# Patient Record
Sex: MALE | Race: WHITE | NOT HISPANIC OR LATINO
[De-identification: names, ages, dates, MRNs, and addresses within clinical notes are randomized per-mention and may not be internally consistent; named-entity substitution may affect disease eponyms.]

---

## 2021-02-19 PROBLEM — Z00.00 ENCOUNTER FOR PREVENTIVE HEALTH EXAMINATION: Status: ACTIVE | Noted: 2021-02-19

## 2021-02-24 ENCOUNTER — APPOINTMENT (OUTPATIENT)
Dept: NEUROLOGY | Facility: CLINIC | Age: 61
End: 2021-02-24
Payer: MEDICAID

## 2021-02-24 VITALS
SYSTOLIC BLOOD PRESSURE: 110 MMHG | DIASTOLIC BLOOD PRESSURE: 71 MMHG | RESPIRATION RATE: 16 BRPM | WEIGHT: 209 LBS | OXYGEN SATURATION: 95 % | HEART RATE: 81 BPM | TEMPERATURE: 98.6 F | HEIGHT: 66.5 IN | BODY MASS INDEX: 33.19 KG/M2

## 2021-02-24 DIAGNOSIS — R51.9 HEADACHE, UNSPECIFIED: ICD-10-CM

## 2021-02-24 DIAGNOSIS — Z87.891 PERSONAL HISTORY OF NICOTINE DEPENDENCE: ICD-10-CM

## 2021-02-24 DIAGNOSIS — Z80.0 FAMILY HISTORY OF MALIGNANT NEOPLASM OF DIGESTIVE ORGANS: ICD-10-CM

## 2021-02-24 DIAGNOSIS — R41.0 DISORIENTATION, UNSPECIFIED: ICD-10-CM

## 2021-02-24 DIAGNOSIS — Z81.8 FAMILY HISTORY OF OTHER MENTAL AND BEHAVIORAL DISORDERS: ICD-10-CM

## 2021-02-24 LAB
BASOPHILS # BLD AUTO: 0.04 K/UL
BASOPHILS NFR BLD AUTO: 0.6 %
EOSINOPHIL # BLD AUTO: 0.11 K/UL
EOSINOPHIL NFR BLD AUTO: 1.7 %
HCT VFR BLD CALC: 47.7 %
HGB BLD-MCNC: 15.4 G/DL
IMM GRANULOCYTES NFR BLD AUTO: 0.6 %
LYMPHOCYTES # BLD AUTO: 1.59 K/UL
LYMPHOCYTES NFR BLD AUTO: 24.3 %
MAN DIFF?: NORMAL
MCHC RBC-ENTMCNC: 28.8 PG
MCHC RBC-ENTMCNC: 32.3 GM/DL
MCV RBC AUTO: 89.2 FL
MONOCYTES # BLD AUTO: 0.68 K/UL
MONOCYTES NFR BLD AUTO: 10.4 %
NEUTROPHILS # BLD AUTO: 4.07 K/UL
NEUTROPHILS NFR BLD AUTO: 62.4 %
PLATELET # BLD AUTO: 253 K/UL
RBC # BLD: 5.35 M/UL
RBC # FLD: 12.4 %
WBC # FLD AUTO: 6.53 K/UL

## 2021-02-24 PROCEDURE — 99072 ADDL SUPL MATRL&STAF TM PHE: CPT

## 2021-02-24 PROCEDURE — 99205 OFFICE O/P NEW HI 60 MIN: CPT

## 2021-02-24 RX ORDER — CLONAZEPAM 2 MG
2 TABLET,DISINTEGRATING ORAL
Refills: 0 | Status: DISCONTINUED | COMMUNITY

## 2021-02-24 NOTE — PHYSICAL EXAM
[Person] : oriented to person [Place] : oriented to place [Time] : oriented to time [Short Term Intact] : short term memory intact [Concentration Intact] : normal concentrating ability [Visual Intact] : visual attention was ~T not ~L decreased [Naming Objects] : no difficulty naming common objects [Fluency] : fluency intact [Comprehension] : comprehension intact [Vocabulary] : adequate range of vocabulary [Cranial Nerves Optic (II)] : visual acuity intact bilaterally,  visual fields full to confrontation, pupils equal round and reactive to light [Cranial Nerves Facial (VII)] : face symmetrical [Motor Tone] : muscle tone was normal in all four extremities [Motor Strength] : muscle strength was normal in all four extremities [Involuntary Movements] : no involuntary movements were seen [Motor Handedness Right-Handed] : the patient is right hand dominant [Sensation Tactile Decrease] : light touch was intact [Abnormal Walk] : normal gait [1+] : Patella left 1+ [FreeTextEntry5] : decrease hearing right

## 2021-02-24 NOTE — ASSESSMENT
[FreeTextEntry1] : Blood markers for inflammatory disease\par CBC\par EEG given the confusional episodes\par Tramadol as needed for severe headaches\par \par I asked the patient to call me if he is symptoms continue to deteriorate\par I would also like to obtain medical records about his underlying cancer from Dr. Gleason
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
## 2021-02-24 NOTE — DISCUSSION/SUMMARY
[FreeTextEntry1] : 60-year-old man with a history of severe headaches for the past 4 months.\par The headache type appear to suggest tension headache.\par However the patient has significant risk factors for a number of possible etiologies including history of cancer which is clearly undefined at this point, head injuries in the past primarily concussions and other risk factors as on the history.\par MRI however it is within normal range

## 2021-02-24 NOTE — REVIEW OF SYSTEMS
[Sleep Disturbances] : sleep disturbances [Memory Lapses or Loss] : memory loss [Difficulty with Language] : ~M difficulty with language [Changed Thought Patterns] : changed thought patterns [Poor Coordination] : poor coordination [Difficulty Writing] : difficulty writing [Lightheadedness] : lightheadedness [Tension Headache] : tension-type headaches [Difficulty Walking] : difficulty walking

## 2021-02-25 LAB
ALBUMIN MFR SERPL ELPH: 57.7 %
ALBUMIN SERPL ELPH-MCNC: 4.4 G/DL
ALBUMIN SERPL-MCNC: 4 G/DL
ALBUMIN/GLOB SERPL: 1.4 RATIO
ALP BLD-CCNC: 114 U/L
ALPHA1 GLOB MFR SERPL ELPH: 3.5 %
ALPHA1 GLOB SERPL ELPH-MCNC: 0.2 G/DL
ALPHA2 GLOB MFR SERPL ELPH: 12.4 %
ALPHA2 GLOB SERPL ELPH-MCNC: 0.9 G/DL
ALT SERPL-CCNC: 24 U/L
ANION GAP SERPL CALC-SCNC: 12 MMOL/L
AST SERPL-CCNC: 28 U/L
B-GLOBULIN MFR SERPL ELPH: 11.2 %
B-GLOBULIN SERPL ELPH-MCNC: 0.8 G/DL
BILIRUB SERPL-MCNC: 0.2 MG/DL
BUN SERPL-MCNC: 14 MG/DL
CALCIUM SERPL-MCNC: 9 MG/DL
CHLORIDE SERPL-SCNC: 101 MMOL/L
CO2 SERPL-SCNC: 23 MMOL/L
CREAT SERPL-MCNC: 1.04 MG/DL
CRP SERPL-MCNC: 0.62 MG/DL
ERYTHROCYTE [SEDIMENTATION RATE] IN BLOOD BY WESTERGREN METHOD: 17 MM/HR
GAMMA GLOB FLD ELPH-MCNC: 1 G/DL
GAMMA GLOB MFR SERPL ELPH: 15.2 %
GLUCOSE SERPL-MCNC: 100 MG/DL
INR PPP: 1.05 RATIO
INTERPRETATION SERPL IEP-IMP: NORMAL
POTASSIUM SERPL-SCNC: 4.4 MMOL/L
PROT SERPL-MCNC: 6.9 G/DL
PT BLD: 12.5 SEC
SODIUM SERPL-SCNC: 137 MMOL/L

## 2021-03-25 ENCOUNTER — APPOINTMENT (OUTPATIENT)
Dept: NEUROLOGY | Facility: CLINIC | Age: 61
End: 2021-03-25

## 2021-04-05 ENCOUNTER — APPOINTMENT (OUTPATIENT)
Dept: NEUROLOGY | Facility: CLINIC | Age: 61
End: 2021-04-05

## 2021-04-13 ENCOUNTER — APPOINTMENT (OUTPATIENT)
Dept: NEUROLOGY | Facility: CLINIC | Age: 61
End: 2021-04-13

## 2021-04-21 ENCOUNTER — INPATIENT (INPATIENT)
Facility: HOSPITAL | Age: 61
LOS: 0 days | Discharge: HOME | DRG: 303 | End: 2021-04-22
Attending: INTERNAL MEDICINE | Admitting: INTERNAL MEDICINE
Payer: MEDICAID

## 2021-04-21 VITALS
HEART RATE: 87 BPM | TEMPERATURE: 98 F | DIASTOLIC BLOOD PRESSURE: 95 MMHG | OXYGEN SATURATION: 97 % | WEIGHT: 205.03 LBS | SYSTOLIC BLOOD PRESSURE: 146 MMHG | RESPIRATION RATE: 18 BRPM | HEIGHT: 67 IN

## 2021-04-21 DIAGNOSIS — R09.89 OTHER SPECIFIED SYMPTOMS AND SIGNS INVOLVING THE CIRCULATORY AND RESPIRATORY SYSTEMS: ICD-10-CM

## 2021-04-21 DIAGNOSIS — Z98.89 OTHER SPECIFIED POSTPROCEDURAL STATES: Chronic | ICD-10-CM

## 2021-04-21 LAB
ALBUMIN SERPL ELPH-MCNC: 4.1 G/DL — SIGNIFICANT CHANGE UP (ref 3.3–5)
ALP SERPL-CCNC: 103 U/L — SIGNIFICANT CHANGE UP (ref 40–120)
ALT FLD-CCNC: 21 U/L — SIGNIFICANT CHANGE UP (ref 10–45)
ANION GAP SERPL CALC-SCNC: 13 MMOL/L — SIGNIFICANT CHANGE UP (ref 5–17)
APTT BLD: 28.7 SEC — SIGNIFICANT CHANGE UP (ref 27.5–35.5)
AST SERPL-CCNC: 26 U/L — SIGNIFICANT CHANGE UP (ref 10–40)
BASOPHILS # BLD AUTO: 0.05 K/UL — SIGNIFICANT CHANGE UP (ref 0–0.2)
BASOPHILS NFR BLD AUTO: 0.7 % — SIGNIFICANT CHANGE UP (ref 0–2)
BILIRUB SERPL-MCNC: 0.8 MG/DL — SIGNIFICANT CHANGE UP (ref 0.2–1.2)
BUN SERPL-MCNC: 11 MG/DL — SIGNIFICANT CHANGE UP (ref 7–23)
CALCIUM SERPL-MCNC: 8.8 MG/DL — SIGNIFICANT CHANGE UP (ref 8.4–10.5)
CHLORIDE SERPL-SCNC: 99 MMOL/L — SIGNIFICANT CHANGE UP (ref 96–108)
CO2 SERPL-SCNC: 25 MMOL/L — SIGNIFICANT CHANGE UP (ref 22–31)
CREAT SERPL-MCNC: 0.88 MG/DL — SIGNIFICANT CHANGE UP (ref 0.5–1.3)
EOSINOPHIL # BLD AUTO: 0.01 K/UL — SIGNIFICANT CHANGE UP (ref 0–0.5)
EOSINOPHIL NFR BLD AUTO: 0.1 % — SIGNIFICANT CHANGE UP (ref 0–6)
GLUCOSE SERPL-MCNC: 103 MG/DL — HIGH (ref 70–99)
HCT VFR BLD CALC: 49.2 % — SIGNIFICANT CHANGE UP (ref 39–50)
HGB BLD-MCNC: 15.8 G/DL — SIGNIFICANT CHANGE UP (ref 13–17)
IMM GRANULOCYTES NFR BLD AUTO: 0.4 % — SIGNIFICANT CHANGE UP (ref 0–1.5)
INR BLD: 1 — SIGNIFICANT CHANGE UP (ref 0.88–1.16)
LYMPHOCYTES # BLD AUTO: 1.58 K/UL — SIGNIFICANT CHANGE UP (ref 1–3.3)
LYMPHOCYTES # BLD AUTO: 23.3 % — SIGNIFICANT CHANGE UP (ref 13–44)
MCHC RBC-ENTMCNC: 26.9 PG — LOW (ref 27–34)
MCHC RBC-ENTMCNC: 32.1 GM/DL — SIGNIFICANT CHANGE UP (ref 32–36)
MCV RBC AUTO: 83.8 FL — SIGNIFICANT CHANGE UP (ref 80–100)
MONOCYTES # BLD AUTO: 0.8 K/UL — SIGNIFICANT CHANGE UP (ref 0–0.9)
MONOCYTES NFR BLD AUTO: 11.8 % — SIGNIFICANT CHANGE UP (ref 2–14)
NEUTROPHILS # BLD AUTO: 4.31 K/UL — SIGNIFICANT CHANGE UP (ref 1.8–7.4)
NEUTROPHILS NFR BLD AUTO: 63.7 % — SIGNIFICANT CHANGE UP (ref 43–77)
NRBC # BLD: 0 /100 WBCS — SIGNIFICANT CHANGE UP (ref 0–0)
PLATELET # BLD AUTO: 256 K/UL — SIGNIFICANT CHANGE UP (ref 150–400)
POTASSIUM SERPL-MCNC: 4 MMOL/L — SIGNIFICANT CHANGE UP (ref 3.5–5.3)
POTASSIUM SERPL-SCNC: 4 MMOL/L — SIGNIFICANT CHANGE UP (ref 3.5–5.3)
PROT SERPL-MCNC: 7.3 G/DL — SIGNIFICANT CHANGE UP (ref 6–8.3)
PROTHROM AB SERPL-ACNC: 12 SEC — SIGNIFICANT CHANGE UP (ref 10.6–13.6)
RBC # BLD: 5.87 M/UL — HIGH (ref 4.2–5.8)
RBC # FLD: 14.1 % — SIGNIFICANT CHANGE UP (ref 10.3–14.5)
SODIUM SERPL-SCNC: 137 MMOL/L — SIGNIFICANT CHANGE UP (ref 135–145)
TROPONIN T SERPL-MCNC: 0.01 NG/ML — SIGNIFICANT CHANGE UP (ref 0–0.01)
TROPONIN T SERPL-MCNC: 0.01 NG/ML — SIGNIFICANT CHANGE UP (ref 0–0.01)
WBC # BLD: 6.78 K/UL — SIGNIFICANT CHANGE UP (ref 3.8–10.5)
WBC # FLD AUTO: 6.78 K/UL — SIGNIFICANT CHANGE UP (ref 3.8–10.5)

## 2021-04-21 PROCEDURE — 93010 ELECTROCARDIOGRAM REPORT: CPT

## 2021-04-21 PROCEDURE — 99285 EMERGENCY DEPT VISIT HI MDM: CPT | Mod: 25

## 2021-04-21 PROCEDURE — 71275 CT ANGIOGRAPHY CHEST: CPT | Mod: 26,MA

## 2021-04-21 RX ORDER — ASPIRIN/CALCIUM CARB/MAGNESIUM 324 MG
81 TABLET ORAL DAILY
Refills: 0 | Status: DISCONTINUED | OUTPATIENT
Start: 2021-04-21 | End: 2021-04-22

## 2021-04-21 RX ORDER — METOPROLOL TARTRATE 50 MG
25 TABLET ORAL ONCE
Refills: 0 | Status: COMPLETED | OUTPATIENT
Start: 2021-04-21 | End: 2021-04-21

## 2021-04-21 RX ORDER — LIDOCAINE 4 G/100G
10 CREAM TOPICAL ONCE
Refills: 0 | Status: COMPLETED | OUTPATIENT
Start: 2021-04-21 | End: 2021-04-21

## 2021-04-21 RX ORDER — ASPIRIN/CALCIUM CARB/MAGNESIUM 324 MG
162 TABLET ORAL ONCE
Refills: 0 | Status: COMPLETED | OUTPATIENT
Start: 2021-04-21 | End: 2021-04-21

## 2021-04-21 RX ORDER — NITROGLYCERIN 6.5 MG
0.4 CAPSULE, EXTENDED RELEASE ORAL
Refills: 0 | Status: DISCONTINUED | OUTPATIENT
Start: 2021-04-21 | End: 2021-04-22

## 2021-04-21 RX ORDER — ATORVASTATIN CALCIUM 80 MG/1
80 TABLET, FILM COATED ORAL AT BEDTIME
Refills: 0 | Status: DISCONTINUED | OUTPATIENT
Start: 2021-04-21 | End: 2021-04-22

## 2021-04-21 RX ORDER — ACETAMINOPHEN 500 MG
650 TABLET ORAL EVERY 6 HOURS
Refills: 0 | Status: DISCONTINUED | OUTPATIENT
Start: 2021-04-21 | End: 2021-04-22

## 2021-04-21 RX ADMIN — Medication 162 MILLIGRAM(S): at 20:39

## 2021-04-21 RX ADMIN — Medication 0.4 MILLIGRAM(S): at 22:08

## 2021-04-21 RX ADMIN — Medication 30 MILLILITER(S): at 20:39

## 2021-04-21 RX ADMIN — Medication 25 MILLIGRAM(S): at 20:39

## 2021-04-21 RX ADMIN — LIDOCAINE 10 MILLILITER(S): 4 CREAM TOPICAL at 20:39

## 2021-04-21 NOTE — H&P ADULT - PROBLEM SELECTOR PLAN 5
hx of liver cancer with 6 mm liver nodule on CT PE protocol, follows with Dr. Gleason  - consult Dr. Gleason in the AM with regards to oral chemo regimen

## 2021-04-21 NOTE — H&P ADULT - NSHPSOCIALHISTORY_GEN_ALL_CORE
lives alone, has daughter out of state. prior smoker, quit in 1979, about 5-10 pack years. denies illicit drug use, prior heavy drinker. 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

## 2021-04-21 NOTE — ED ADULT TRIAGE NOTE - CHIEF COMPLAINT QUOTE
Pt sent to ED by Dr. Gleason for evaluation of persistent non-radiating midsternal CP that began 1 week ago. Pt w/ hx of MI, liver ca, "clogged arteries". Denies fever, chills, SOB, palpitations, back pain.

## 2021-04-21 NOTE — H&P ADULT - NSICDXPASTMEDICALHX_GEN_ALL_CORE_FT
PAST MEDICAL HISTORY:  HLD (hyperlipidemia)     HTN (hypertension)     Hypercholesterolemia     Liver cancer     Myocardial infarction

## 2021-04-21 NOTE — H&P ADULT - NSHPPHYSICALEXAM_GEN_ALL_CORE
VITAL SIGNS:  T(C): 36.4 (04-21-21 @ 22:23), Max: 37 (04-21-21 @ 17:48)  T(F): 97.5 (04-21-21 @ 22:23), Max: 98.6 (04-21-21 @ 17:48)  HR: 68 (04-21-21 @ 22:23) (67 - 87)  BP: 133/82 (04-21-21 @ 22:23) (133/82 - 165/84)  BP(mean): --  RR: 16 (04-21-21 @ 22:23) (16 - 18)  SpO2: 95% (04-21-21 @ 22:23) (95% - 97%)  Wt(kg): --    PHYSICAL EXAM:    Constitutional: WDWN, lying comfortably in bed, NAD  Head: Nc/At  Eyes: PERRL, EOMI, clear conjunctiva  ENT: no nasal discharge; uvula midline, no oropharyngeal erythema or exudates; MMM  Neck: supple; no JVD or thyromegaly  Respiratory: CTA b/l, no wheezes, rales, or rhonchi  Cardiac: +S1/S2, +RRR, no murmurs, rubs, or gallops  Gastrointestinal: soft, non-tender, non-distended, no rebound/guarding, no palpable masses, normoactive bowel sounds x4  Back: spine midline, no bony tenderness or step-offs; no CVAT B/L  Extremities: WWP, no clubbing or cyanosis, no peripheral edema  Musculoskeletal: NROM x4; no joint swelling, tenderness or erythema  Vascular: 2+ radial and DP pulses b/l  Dermatologic: skin warm, dry and intact, no rashes, wounds, or scars  Lymphatic: no submandibular or cervical LAD  Neurologic: AAOx3, CNII-XII grossly intact, no focal deficits  Psychiatric: affect and characteristics of appearance, verbalizations, behaviors are appropriate VITAL SIGNS:  T(C): 36.4 (04-21-21 @ 22:23), Max: 37 (04-21-21 @ 17:48)  T(F): 97.5 (04-21-21 @ 22:23), Max: 98.6 (04-21-21 @ 17:48)  HR: 68 (04-21-21 @ 22:23) (67 - 87)  BP: 133/82 (04-21-21 @ 22:23) (133/82 - 165/84)  BP(mean): --  RR: 16 (04-21-21 @ 22:23) (16 - 18)  SpO2: 95% (04-21-21 @ 22:23) (95% - 97%)  Wt(kg): --    PHYSICAL EXAM:    Constitutional: WDWN, lying comfortably in bed, NAD, face appears reddish  Head: Nc/At  Eyes: PERRL, EOMI, clear conjunctiva  ENT: no nasal discharge; MMM  Neck: supple; no JVD  Respiratory: CTA b/l, no wheezes, rales, or rhonchi  Cardiac: +S1/S2, +RRR, no murmurs, rubs, or gallops  Gastrointestinal: soft, non-tender, non-distended, normoactive bowel sounds x4; abdominal surgical scar RUQ and infra umbilical noted  Extremities: WWP, no clubbing or cyanosis, no peripheral edema  Vascular: 2+ radial and DP pulses b/l  Dermatologic: skin warm, dry and intact, no rashes, wounds  Neurologic: AAOx3, CNII-XII grossly intact, no focal deficits, 5/5 strength b/l UE and LE

## 2021-04-21 NOTE — H&P ADULT - PROBLEM SELECTOR PLAN 6
F: no IVF  E: K>4 Mg>2, monitor and replete as needed  N: DASH diet  Ppx: lovenox  D: cardiac telemetry  FULL CODE

## 2021-04-21 NOTE — ED PROVIDER NOTE - IV ALTEPLASE ADMIN OUTSIDE HIDDEN
Review of Systems/Medical History  Patient summary reviewed  Chart reviewed  No history of anesthetic complications     Cardiovascular  Negative cardio ROS Exercise tolerance (METS): >4 ,     Pulmonary  Smoker cigarette smoker  ,   Comment: Lung ca     GI/Hepatic    GI bleeding (requiring blood transfusion) , history of,        Kidney stones, Genitourinary malignancy renal cancer,   Comment: Hx kidney carcinoma  Endo/Other  Negative endo/other ROS      GYN       Hematology  Negative hematology ROS      Musculoskeletal  Back pain (3 crushed vertebrae due to injury as a kid) , lumbar pain,   Arthritis     Neurology      Comment: Brain tumor s/p radiation, left sided weakness (mainly coordination) Psychology   Negative psychology ROS              Physical Exam    Airway    Mallampati score: I  TM Distance: >3 FB  Neck ROM: full     Dental   lower dentures and upper dentures,     Cardiovascular  Comment: Negative ROS, Rhythm: regular, Rate: normal,     Pulmonary  Breath sounds clear to auscultation, Decreased breath sounds,     Other Findings  edentulous      Anesthesia Plan  ASA Score- 3     Anesthesia Type- general with ASA Monitors  Additional Monitors:   Airway Plan:           Plan Factors-Exercise tolerance (METS): >4     Chart reviewed  Patient summary reviewed  Patient is a current smoker  Patient not instructed to abstain from smoking on day of procedure  Patient smoked on day of surgery  Induction- intravenous  Postoperative Plan- Plan for postoperative opioid use  Informed Consent- Anesthetic plan and risks discussed with patient and son  show

## 2021-04-21 NOTE — H&P ADULT - ASSESSMENT
55M with CVA (2-3 years ago, residual left arm numbness/weakness though improving almost baseline), nonobstructive CAD (prior MI), HLD, HTN, Hep C, liver cancer (sees Dr. Gleason, oncologist, s/p liver resection October 2020 at [facility]), presents with 7 day hx of chest pain. Trop neg x2, EKG w/ new LAFB (qR I, aVL, rS inferior leads), LAD compared to EKG at [facility] 10/2016. Admit for unstable angina management

## 2021-04-21 NOTE — H&P ADULT - NSTELEHEALTH_GEN_ALL_CORE
No
60M, from home, self ambulatory,  recently [place]'ed from [facility],  with a PMhx of recently diagnosed HLD , R  MCA stroke with residual L arm weakness( feb 2021)  reports to the ED c.o shortness of breath for x2 days. Pt admitted for SOB work up

## 2021-04-21 NOTE — ED PROVIDER NOTE - OBJECTIVE STATEMENT
60M with a pmhx of CAD, MI, HTN, HLD, Hep C, liver CA, sent in by Dr Gleason presents with nonexertional sternal chest pain. Patient reports pain worsens at night. Patient denies any fever, chills or any other acute medical complaints at this time. patient denies any illicit drug use. 60M with a pmhx of CAD, MI, HTN, HLD, Hep C, liver CA, sent in by Dr Gleason presents with nonexertional sternal chest pain. Patient reports pain worsens at night. Patient denies any fever, chills or any other acute medical complaints at this time. patient denies any illicit drug use.  pt as per Dr Gleason with chronic RUQ biloma in location of removed hepatoma w chronic pain at this location but no intervention advised. pt does endorse unchanged mild RUQ pain .

## 2021-04-21 NOTE — H&P ADULT - PROBLEM SELECTOR PLAN 2
hx of nonbstructive CAD s/p diagnostic [facility] 10/2016 with Dr. Alvarez at [facility]  - continue atorvastatin 80 mg QHS  - continue ASA 81 mg

## 2021-04-21 NOTE — ED PROVIDER NOTE - PMH
HLD (hyperlipidemia)    HTN (hypertension)    Hypercholesterolemia    Liver cancer    Myocardial infarction

## 2021-04-21 NOTE — H&P ADULT - PROBLEM SELECTOR PLAN 4
hx of HTN, not on medications  - start metoprolol 25 mg QHS  - consider ACEi/ARB if HTN uncontrolled

## 2021-04-21 NOTE — H&P ADULT - PROBLEM SELECTOR PLAN 1
relieved with nitro, patient has had chest pain for 7 days, nonreproducible. patient with known nonobstructive CAD s/p LHC 10/2016 at [facility]. Patient noncomplaint with asa and atorvastatin, poor follow-up. EKG shows new LAFB (prior in 10/2016 was NSR with LAD). trop neg x2  - c/w PRN nitroglycerin  - pain control w/ tylenol for now, consider morphine if worsening  - serial EKG and trend cardiac enzymes if chest pain worsens  - CCTA in the morning  - start metoprolol 25 mg q12h

## 2021-04-21 NOTE — ED PROVIDER NOTE - CLINICAL SUMMARY MEDICAL DECISION MAKING FREE TEXT BOX
60M with known liver CA with x1 week of new chest pain.    DDx: Consider ACS. Consider PE, although less likely due to patient being nonpleuritic, and with no hypoxia. Consider metastatic disease, possible pain radiating from tumor.     Plan: Will order Labs EKG and CXR. Will discuss patient care with Dr. Gleason. Anticipate admissions. 60M with known liver CA with x1 week of new chest pain.    DDx: Consider ACS. Consider PE, although less likely due to patient being nonpleuritic, and with no hypoxia. Consider metastatic disease, possible pain radiating from tumor.     Plan: Will order Labs EKG and CXR. Will discuss patient care with Dr. Gleason.

## 2021-04-21 NOTE — H&P ADULT - HISTORY OF PRESENT ILLNESS
55M with CVA (2-3 years ago, residual left arm numbness/weakness though improving almost baseline), nonobstructive CAD (prior MI), HLD, HTN, Hep C, liver cancer (sees Dr. Gleason, oncologist, s/p liver resection October 2020 at [facility]), presents with 7 day hx of chest pain. Patient is a poor historian and noncompliant with home med of aspirin and atorvastatin. Chest pain for past 7 days, substernal, sharp, radiates intermittently to lower jaw and left arm, associated with palpitations, pain is 10/10 at night and 6/10 throughout the day. Patient thought the pain would simply "go away" and finally came in today as it became unbearable during his f/u with Dr. Gleason outpatient. Patient has had poor f/u and never seen cardiologist, though follows intermittently with neurologist. He states ever since his cancer diagnosis and social stressor of securing his daughter a property/home, he has not had time for care of himself. He is noncompliant on oral chemo for his liver cancer. Of note, at [facility] on 10/7/16 s/p [facility] w/ no intervention cath revealed mild non obstructive disease. ROS: patient +palpitations, chest pain, decreased physical tolerance, denies fever, chills, n/v, abd pain, shortness of breath    ED vitals: T 98.5, HR 87, /95, RR 18, O2 sat 97% on RA  ED labs: Hb 15.8, Plt 256, BUN 11, Cr 0.88, Gluc 103, Trop 0.01 x2  ED studies: EKG NSR with LAFB, TWI V1, no ischemic ST changes. CTA PE protocol no PE, 6 cm lesion in R lobe of liver  ED course:  mg, loperssor 25 mg x1, nitro 0.4 mg x1, maalox 30 mg x1, lidocaine 2% 10 mL x1

## 2021-04-21 NOTE — H&P ADULT - NSHPLABSRESULTS_GEN_ALL_CORE
LABS:                         15.8   6.78  )-----------( 256      ( 21 Apr 2021 16:26 )             49.2     04-21    137  |  99  |  11  ----------------------------<  103<H>  4.0   |  25  |  0.88    Ca    8.8      21 Apr 2021 16:26    TPro  7.3  /  Alb  4.1  /  TBili  0.8  /  DBili  x   /  AST  26  /  ALT  21  /  AlkPhos  103  04-21    PT/INR - ( 21 Apr 2021 16:26 )   PT: 12.0 sec;   INR: 1.00          PTT - ( 21 Apr 2021 16:26 )  PTT:28.7 sec    CARDIAC MARKERS ( 21 Apr 2021 20:33 )  x     / 0.01 ng/mL / x     / x     / x      CARDIAC MARKERS ( 21 Apr 2021 16:26 )  x     / 0.01 ng/mL / x     / x     / x        RADIOLOGY, EKG & ADDITIONAL TESTS:  < from: CT Angio Chest PE Protocol w/ IV Cont (04.21.21 @ 21:23) >    FINDINGS:    No pulmonary embolism is seen. The pulmonary trunk is normal caliber. There is no airspace consolidation. The central airways are patent. There is no pleural effusion.    Small calcified and noncalcified plaque in the thoracic aorta. No thoracic aortic dissection or aneurysm. The heart is normal in size. No pericardial effusion is seen. Trace calcification of the aortic valve.    No mediastinal, hilar or axillary lymphadenopathy is seen.    Limited evaluation of the upper abdomen demonstrates a small hiatal hernia. There is a 6 x 3 x 4 cm subcapsular hypoattenuating area in the right lobe of the liver, with overlying capsular retraction and internal curvilinear hyperdensities possibly from prior intervention. Mild colonic diverticulosis.    Evaluation of the osseous structures demonstrates degenerative changes of the spine and DISH. Partially imaged cervical spinal fusion hardware.  Multiple healed right rib fracture deformities.    IMPRESSION:  1.No pulmonary embolism or other acute pathology seen.  2.  6 cm lesion in the right lobe of the liver, with features suggestive of prior intervention. Correlate with patient history and/or prior imaging if available. Otherwise, follow-up MRI is recommended.

## 2021-04-21 NOTE — ED ADULT NURSE NOTE - OBJECTIVE STATEMENT
Pt AOX4. Pt /co midsternal chest pain that started a week ago. Pt states "for the past week I have felt a little SOB when the chest pain started and I also feel constantly nauseous". Pt denies fevers, chills, numbness, weakness, tingling, changes in vision, radiating pain. Pt speaking in full complete sentences. Respirations even and unlabored.

## 2021-04-21 NOTE — ED ADULT NURSE REASSESSMENT NOTE - NS ED NURSE REASSESS COMMENT FT1
Patient verbalized improvement in chest pain SP Nitroglycerin sublingual given as ordered. Assessment ongoing.
Received patient in stretcher. AOX4. Pt with elevated BP and complaints of chest pain 6/10. Patient oriented to ED area. Plan of care discussed and verbalized understanding. All needs attended. Pt on cardiac monitor. Hourly rounding in progress.

## 2021-04-21 NOTE — H&P ADULT - NSICDXFAMILYHX_GEN_ALL_CORE_FT
FAMILY HISTORY:  Mother  Still living? Unknown  Family history of aortic aneurysm, Age at diagnosis: Age Unknown  Family history of cancer, Age at diagnosis: Age Unknown

## 2021-04-22 ENCOUNTER — TRANSCRIPTION ENCOUNTER (OUTPATIENT)
Age: 61
End: 2021-04-22

## 2021-04-22 VITALS
OXYGEN SATURATION: 94 % | SYSTOLIC BLOOD PRESSURE: 143 MMHG | DIASTOLIC BLOOD PRESSURE: 83 MMHG | RESPIRATION RATE: 20 BRPM | HEART RATE: 72 BPM

## 2021-04-22 DIAGNOSIS — C22.9 MALIGNANT NEOPLASM OF LIVER, NOT SPECIFIED AS PRIMARY OR SECONDARY: ICD-10-CM

## 2021-04-22 DIAGNOSIS — E78.5 HYPERLIPIDEMIA, UNSPECIFIED: ICD-10-CM

## 2021-04-22 DIAGNOSIS — I10 ESSENTIAL (PRIMARY) HYPERTENSION: ICD-10-CM

## 2021-04-22 DIAGNOSIS — R63.8 OTHER SYMPTOMS AND SIGNS CONCERNING FOOD AND FLUID INTAKE: ICD-10-CM

## 2021-04-22 DIAGNOSIS — I20.0 UNSTABLE ANGINA: ICD-10-CM

## 2021-04-22 DIAGNOSIS — I25.10 ATHEROSCLEROTIC HEART DISEASE OF NATIVE CORONARY ARTERY WITHOUT ANGINA PECTORIS: ICD-10-CM

## 2021-04-22 LAB
ANION GAP SERPL CALC-SCNC: 11 MMOL/L — SIGNIFICANT CHANGE UP (ref 5–17)
BUN SERPL-MCNC: 13 MG/DL — SIGNIFICANT CHANGE UP (ref 7–23)
CALCIUM SERPL-MCNC: 9 MG/DL — SIGNIFICANT CHANGE UP (ref 8.4–10.5)
CHLORIDE SERPL-SCNC: 99 MMOL/L — SIGNIFICANT CHANGE UP (ref 96–108)
CO2 SERPL-SCNC: 27 MMOL/L — SIGNIFICANT CHANGE UP (ref 22–31)
COVID-19 SPIKE DOMAIN AB INTERP: NEGATIVE — SIGNIFICANT CHANGE UP
COVID-19 SPIKE DOMAIN ANTIBODY RESULT: 0.4 U/ML — SIGNIFICANT CHANGE UP
CREAT SERPL-MCNC: 0.89 MG/DL — SIGNIFICANT CHANGE UP (ref 0.5–1.3)
GLUCOSE SERPL-MCNC: 103 MG/DL — HIGH (ref 70–99)
HCT VFR BLD CALC: 49.3 % — SIGNIFICANT CHANGE UP (ref 39–50)
HCV AB S/CO SERPL IA: 13.83 S/CO — SIGNIFICANT CHANGE UP
HCV AB SERPL-IMP: REACTIVE
HGB BLD-MCNC: 15.7 G/DL — SIGNIFICANT CHANGE UP (ref 13–17)
MAGNESIUM SERPL-MCNC: 2.1 MG/DL — SIGNIFICANT CHANGE UP (ref 1.6–2.6)
MCHC RBC-ENTMCNC: 26.7 PG — LOW (ref 27–34)
MCHC RBC-ENTMCNC: 31.8 GM/DL — LOW (ref 32–36)
MCV RBC AUTO: 83.8 FL — SIGNIFICANT CHANGE UP (ref 80–100)
NRBC # BLD: 0 /100 WBCS — SIGNIFICANT CHANGE UP (ref 0–0)
PLATELET # BLD AUTO: 247 K/UL — SIGNIFICANT CHANGE UP (ref 150–400)
POTASSIUM SERPL-MCNC: 3.8 MMOL/L — SIGNIFICANT CHANGE UP (ref 3.5–5.3)
POTASSIUM SERPL-SCNC: 3.8 MMOL/L — SIGNIFICANT CHANGE UP (ref 3.5–5.3)
RBC # BLD: 5.88 M/UL — HIGH (ref 4.2–5.8)
RBC # FLD: 14.3 % — SIGNIFICANT CHANGE UP (ref 10.3–14.5)
SARS-COV-2 IGG+IGM SERPL QL IA: 0.4 U/ML — SIGNIFICANT CHANGE UP
SARS-COV-2 IGG+IGM SERPL QL IA: NEGATIVE — SIGNIFICANT CHANGE UP
SARS-COV-2 RNA SPEC QL NAA+PROBE: SIGNIFICANT CHANGE UP
SODIUM SERPL-SCNC: 137 MMOL/L — SIGNIFICANT CHANGE UP (ref 135–145)
WBC # BLD: 4.81 K/UL — SIGNIFICANT CHANGE UP (ref 3.8–10.5)
WBC # FLD AUTO: 4.81 K/UL — SIGNIFICANT CHANGE UP (ref 3.8–10.5)

## 2021-04-22 PROCEDURE — 85025 COMPLETE CBC W/AUTO DIFF WBC: CPT

## 2021-04-22 PROCEDURE — 86769 SARS-COV-2 COVID-19 ANTIBODY: CPT

## 2021-04-22 PROCEDURE — 71275 CT ANGIOGRAPHY CHEST: CPT

## 2021-04-22 PROCEDURE — 83735 ASSAY OF MAGNESIUM: CPT

## 2021-04-22 PROCEDURE — 99285 EMERGENCY DEPT VISIT HI MDM: CPT | Mod: 25

## 2021-04-22 PROCEDURE — 85610 PROTHROMBIN TIME: CPT

## 2021-04-22 PROCEDURE — 93005 ELECTROCARDIOGRAM TRACING: CPT

## 2021-04-22 PROCEDURE — 80053 COMPREHEN METABOLIC PANEL: CPT

## 2021-04-22 PROCEDURE — 99239 HOSP IP/OBS DSCHRG MGMT >30: CPT

## 2021-04-22 PROCEDURE — 87521 HEPATITIS C PROBE&RVRS TRNSC: CPT

## 2021-04-22 PROCEDURE — U0005: CPT

## 2021-04-22 PROCEDURE — 75574 CT ANGIO HRT W/3D IMAGE: CPT | Mod: 26

## 2021-04-22 PROCEDURE — 86803 HEPATITIS C AB TEST: CPT

## 2021-04-22 PROCEDURE — 80048 BASIC METABOLIC PNL TOTAL CA: CPT

## 2021-04-22 PROCEDURE — 93306 TTE W/DOPPLER COMPLETE: CPT

## 2021-04-22 PROCEDURE — 36415 COLL VENOUS BLD VENIPUNCTURE: CPT

## 2021-04-22 PROCEDURE — 85027 COMPLETE CBC AUTOMATED: CPT

## 2021-04-22 PROCEDURE — 85730 THROMBOPLASTIN TIME PARTIAL: CPT

## 2021-04-22 PROCEDURE — 84484 ASSAY OF TROPONIN QUANT: CPT

## 2021-04-22 PROCEDURE — U0003: CPT

## 2021-04-22 PROCEDURE — 93306 TTE W/DOPPLER COMPLETE: CPT | Mod: 26

## 2021-04-22 PROCEDURE — 75574 CT ANGIO HRT W/3D IMAGE: CPT

## 2021-04-22 RX ORDER — ENOXAPARIN SODIUM 100 MG/ML
40 INJECTION SUBCUTANEOUS EVERY 24 HOURS
Refills: 0 | Status: DISCONTINUED | OUTPATIENT
Start: 2021-04-22 | End: 2021-04-22

## 2021-04-22 RX ORDER — METOPROLOL TARTRATE 50 MG
25 TABLET ORAL
Refills: 0 | Status: DISCONTINUED | OUTPATIENT
Start: 2021-04-22 | End: 2021-04-22

## 2021-04-22 RX ADMIN — Medication 25 MILLIGRAM(S): at 06:27

## 2021-04-22 RX ADMIN — Medication 25 MILLIGRAM(S): at 17:36

## 2021-04-22 RX ADMIN — ATORVASTATIN CALCIUM 80 MILLIGRAM(S): 80 TABLET, FILM COATED ORAL at 01:02

## 2021-04-22 RX ADMIN — ENOXAPARIN SODIUM 40 MILLIGRAM(S): 100 INJECTION SUBCUTANEOUS at 07:00

## 2021-04-22 RX ADMIN — Medication 81 MILLIGRAM(S): at 15:25

## 2021-04-22 NOTE — DISCHARGE NOTE NURSING/CASE MANAGEMENT/SOCIAL WORK - PATIENT PORTAL LINK FT
You can access the FollowMyHealth Patient Portal offered by [facility] by registering at the following website: [email or web address]. By joining [facility]’s FollowMyHealth portal, you will also be able to view your health information using other applications (apps) compatible with our system.

## 2021-04-22 NOTE — CONSULT NOTE ADULT - ASSESSMENT
the patient has a history of a hepatocellular carcinoma and erythrocytosis.  He has a history of exhaustion is to testosterone administration.  His erythrocytosis could be either secondary to an erythropoietin secreting hepatocellular carcinoma or from exogenous testosterone or early polycythemia vera.  His  workup has not been completed.      The patient needed troponin levels and BNP levels to assist in the possible diagnosis of coronary artery disease requiring intervention.  Those levels were not seen in the emergency room blood work.

## 2021-04-22 NOTE — DISCHARGE NOTE PROVIDER - CARE PROVIDERS DIRECT ADDRESSES
,DirectAddress_Unknown ,DirectAddress_Unknown,[email or web address]

## 2021-04-22 NOTE — DISCHARGE NOTE PROVIDER - HOSPITAL COURSE
55M with CVA (2-3 years ago, residual left arm numbness/weakness though improving almost baseline), nonobstructive CAD (prior MI), HLD, HTN, Hep C, liver cancer (sees Dr. Gleason, oncologist, s/p liver resection October 2020 at [facility]), presents with 7 day hx of chest pain. Patient is a poor historian and noncompliant with home med of aspirin and atorvastatin. Chest pain for past 7 days, substernal, sharp, radiates intermittently to lower jaw and left arm, associated with palpitations, pain is 10/10 at night and 6/10 throughout the day. Patient thought the pain would simply "go away" and finally came in today as it became unbearable during his f/u with Dr. Gleason outpatient. Patient has had poor f/u and never seen cardiologist, though follows intermittently with neurologist. He states ever since his cancer diagnosis and social stressor of securing his daughter a property/home, he has not had time for care of himself. He is noncompliant on oral chemo for his liver cancer. Of note, at [facility] on 10/7/16 s/p [facility] w/ no intervention cath revealed mild non obstructive disease. ROS: patient +palpitations, chest pain, decreased physical tolerance, denies fever, chills, n/v, abd pain, shortness of breath.    ED vitals: T 98.5, HR 87, /95, RR 18, O2 sat 97% on RA  ED labs: Hb 15.8, Plt 256, BUN 11, Cr 0.88, Gluc 103, Trop 0.01 x2  ED studies: EKG NSR with LAFB, TWI V1, no ischemic ST changes. CTA PE protocol no PE, 6 cm lesion in R lobe of liver  ED course:  mg, loperssor 25 mg x1, nitro 0.4 mg x1, maalox 30 mg x1, lidocaine 2% 10 mL x1 55M, poor historian, non-complaint w/ meds, with CVA (2-3 years ago, residual left arm numbness/weakness though improving almost baseline), non-obstructive CAD (cath at [facility] 2016), HLD, HTN, Hep C, liver cancer (sees Dr. Gleason, oncologist, s/p liver resection October 2020 at [facility]), presents with 7 day hx of substernal sharp chest pain associated with lightheadedness, n/v, palpitations.  He is noncompliant on oral chemo for his liver cancer.  VSS. CTA PE protocol no PE, 6 cm lesion in R lobe of liver. Trop neg x2. EKG NSR 70s, no ischemic ST changes. Admit to cardiac tele and ruled out for ACS. ECHO revealed EF 60-65%, no significant valvular disease. CCTA revealed moderate calcium score 131, non-obstructive CAD.     On the day of discharge, the patient was seen and examined. Symptoms improved. Vital signs are stable. Labs and imaging reviewed. Patient is medically optimized and hemodynamically stable. Return precautions discussed, medication teach back done, and importance of physician followup emphasized. The patient verbalized understanding.

## 2021-04-22 NOTE — CONSULT NOTE ADULT - TIME BILLING
the patient needs BNP and troponin levels and needs to be evaluated for inadequate arterial supply for the cardiac work he is performing with probably unstable angina and labile hypertension.  Erythrocytosis does not appear to play a role in this regard.  Please note the patient was seen and treated yesterday and seen and treated today.

## 2021-04-22 NOTE — DISCHARGE NOTE PROVIDER - NSDCCPTREATMENT_GEN_ALL_CORE_FT
PRINCIPAL PROCEDURE  Procedure: 2D echocardiography  Findings and Treatment: FINDINGS:  Left Ventricle:  The left ventricle is normal in size, wall thickness, and systolic function with a calculated ejection fraction of 60-65%. There is normal left ventricular diastolic function and filling pressure.  Right Ventricle:  The right ventricle is normal in size. Right ventricular systolic function is normal. The tricuspid annular plane systolic excursion (TAPSE) is 19.00 mm (normal >=17 mm).  Left Atrium:  The left atrium is normal in size. Left atrial volume index (GILBERT) is 18.1 ml/m².  Right Atrium:  The right atrium is normal in size.  Aortic Valve:  The aortic valve is tricuspid with normal structure and function without stenosis. There is no evidence of aortic regurgitation.  Mitral Valve:  Structurally normal mitral valve with normal leaflet excursion. There is trace mitral regurgitation.  Tricuspid Valve:  Structurally normal tricuspid valve with normal leaflet excursion. There is trace tricuspid regurgitation. There was insufficient tricuspid regurgitation detected from which to calculate pulmonary artery systolic pressure.  Inferior Vena Cava:  The inferior vena cava is normal in size (<2.1cm) with normal inspiratory collapse (>50%) consistent with normal right atrial pressure (  3, range 0-5mmHg).  Pulmonic Valve:  Structurally normal pulmonic valve with normal leaflet excursion. There is trace pulmonic regurgitation.  Aorta:  The aortic root is normal in size.  Pericardium:  No pericardial effusion is seen.      SECONDARY PROCEDURE  Procedure: Coronary CT angiography (CTA) with calcium scoring  Findings and Treatment: IMPRESSION:  1.  The calcium score is moderate at 131 Agatston units, which is at the 74th percentile, adjusted for age, gender and race.  2.  Non-obstructive coronary artery disease.

## 2021-04-22 NOTE — DISCHARGE NOTE PROVIDER - NSDCCPCAREPLAN_GEN_ALL_CORE_FT
PRINCIPAL DISCHARGE DIAGNOSIS  Diagnosis: Chest pain  Assessment and Plan of Treatment: You came into the hospital with chest pain.       PRINCIPAL DISCHARGE DIAGNOSIS  Diagnosis: Chest pain  Assessment and Plan of Treatment: You came into the hospital with chest pain and underwent a cardiac work up. You had a normal echocardiogram with normal Ejection Fraction 60-65%. CTA Scan of the Heart showed non-obstructive coronary artery disease, which means you have mild plaque buildup in the arteries of the heart. This would be treated medically by continuing baby Aspirin and statin cholesterol medication every day. Follow up with your primary care provider and oncologist for further evaluation of the chest pain.      SECONDARY DISCHARGE DIAGNOSES  Diagnosis: Liver cancer  Assessment and Plan of Treatment: Follow up with oncologist Dr Gleason for further work up and treatment.
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
## 2021-04-22 NOTE — DISCHARGE NOTE PROVIDER - PROVIDER TOKENS
PROVIDER:[TOKEN:[[id number]],FOLLOWUP:[2 weeks],ESTABLISHEDPATIENT:[T]] PROVIDER:[TOKEN:[[id number]],FOLLOWUP:[2 weeks],ESTABLISHEDPATIENT:[T]],PROVIDER:[TOKEN:[[id number]],FOLLOWUP:[Routine]]

## 2021-04-22 NOTE — DISCHARGE NOTE PROVIDER - NSDCMRMEDTOKEN_GEN_ALL_CORE_FT
acetaminophen 325 mg oral tablet: 2 tab(s) orally every 6 hours, As needed, Mild Pain (1 - 3)  aspirin 81 mg oral delayed release tablet: 1 tab(s) orally once a day  atorvastatin 80 mg oral tablet: 1 tab(s) orally once a day (at bedtime)  CITALOPRAM   TAB 40MG:   CLONAZEPAM   TAB 2MG:  orally once a day, As Needed  OXYCODONE    TAB 5MG:

## 2021-04-22 NOTE — DISCHARGE NOTE PROVIDER - CARE PROVIDER_API CALL
Mychal Gleason)  Internal Medicine; Medical Oncology  [address] [place] [address]  Phone: [phone]  Fax: [phone]  Established Patient  Follow Up Time: 2 weeks   Mychal Gleason)  Internal Medicine; Medical Oncology  [address] [place] [address]  Phone: [phone]  Fax: [phone]  Established Patient  Follow Up Time: 2 weeks    Lauro Garcia)  Cardiovascular Disease; Internal Medicine  Cardiology [facility], [address] [place] [address]  Phone: [phone]  Fax: [phone]  Follow Up Time: Routine

## 2021-04-22 NOTE — DISCHARGE NOTE PROVIDER - NSDCFUADDINST_GEN_ALL_CORE_FT
You are set up for [employer] service at 6:30pm. You may call [email or web address] at [phone] if you have questions or concerns regarding the taxi service.

## 2021-04-27 LAB — HCV RNA FLD QL NAA+PROBE: SIGNIFICANT CHANGE UP

## 2021-04-28 DIAGNOSIS — R07.9 CHEST PAIN, UNSPECIFIED: ICD-10-CM

## 2021-04-28 DIAGNOSIS — I69.334 MONOPLEGIA OF UPPER LIMB FOLLOWING CEREBRAL INFARCTION AFFECTING LEFT NON-DOMINANT SIDE: ICD-10-CM

## 2021-04-28 DIAGNOSIS — C22.9 MALIGNANT NEOPLASM OF LIVER, NOT SPECIFIED AS PRIMARY OR SECONDARY: ICD-10-CM

## 2021-04-28 DIAGNOSIS — R35.1 NOCTURIA: ICD-10-CM

## 2021-04-28 DIAGNOSIS — N40.1 BENIGN PROSTATIC HYPERPLASIA WITH LOWER URINARY TRACT SYMPTOMS: ICD-10-CM

## 2021-04-28 DIAGNOSIS — Z91.14 PATIENT'S OTHER NONCOMPLIANCE WITH MEDICATION REGIMEN: ICD-10-CM

## 2021-04-28 DIAGNOSIS — R68.82 DECREASED LIBIDO: ICD-10-CM

## 2021-04-28 DIAGNOSIS — Z79.82 LONG TERM (CURRENT) USE OF ASPIRIN: ICD-10-CM

## 2021-04-28 DIAGNOSIS — I25.2 OLD MYOCARDIAL INFARCTION: ICD-10-CM

## 2021-04-28 DIAGNOSIS — Z87.891 PERSONAL HISTORY OF NICOTINE DEPENDENCE: ICD-10-CM

## 2021-04-28 DIAGNOSIS — I10 ESSENTIAL (PRIMARY) HYPERTENSION: ICD-10-CM

## 2021-04-28 DIAGNOSIS — N52.9 MALE ERECTILE DYSFUNCTION, UNSPECIFIED: ICD-10-CM

## 2021-04-28 DIAGNOSIS — E78.5 HYPERLIPIDEMIA, UNSPECIFIED: ICD-10-CM

## 2021-04-28 DIAGNOSIS — Z86.19 PERSONAL HISTORY OF OTHER INFECTIOUS AND PARASITIC DISEASES: ICD-10-CM

## 2021-04-28 DIAGNOSIS — I25.110 ATHEROSCLEROTIC HEART DISEASE OF NATIVE CORONARY ARTERY WITH UNSTABLE ANGINA PECTORIS: ICD-10-CM

## 2021-04-28 DIAGNOSIS — B19.20 UNSPECIFIED VIRAL HEPATITIS C WITHOUT HEPATIC COMA: ICD-10-CM

## 2021-11-16 PROBLEM — C22.9 MALIGNANT NEOPLASM OF LIVER, NOT SPECIFIED AS PRIMARY OR SECONDARY: Chronic | Status: ACTIVE | Noted: 2021-04-21

## 2021-11-16 PROBLEM — I10 ESSENTIAL (PRIMARY) HYPERTENSION: Chronic | Status: ACTIVE | Noted: 2021-04-21

## 2021-11-16 PROBLEM — E78.5 HYPERLIPIDEMIA, UNSPECIFIED: Chronic | Status: ACTIVE | Noted: 2021-04-21

## 2021-11-17 ENCOUNTER — APPOINTMENT (OUTPATIENT)
Dept: HEART AND VASCULAR | Facility: CLINIC | Age: 61
End: 2021-11-17
Payer: MEDICAID

## 2021-11-17 ENCOUNTER — NON-APPOINTMENT (OUTPATIENT)
Age: 61
End: 2021-11-17

## 2021-11-17 VITALS
HEIGHT: 66 IN | HEART RATE: 92 BPM | SYSTOLIC BLOOD PRESSURE: 144 MMHG | BODY MASS INDEX: 35.36 KG/M2 | DIASTOLIC BLOOD PRESSURE: 94 MMHG | WEIGHT: 220 LBS | OXYGEN SATURATION: 96 % | TEMPERATURE: 96.1 F

## 2021-11-17 DIAGNOSIS — C22.9 MALIGNANT NEOPLASM OF LIVER, NOT SPECIFIED AS PRIMARY OR SECONDARY: ICD-10-CM

## 2021-11-17 DIAGNOSIS — I25.10 ATHEROSCLEROTIC HEART DISEASE OF NATIVE CORONARY ARTERY W/OUT ANGINA PECTORIS: ICD-10-CM

## 2021-11-17 DIAGNOSIS — I10 ESSENTIAL (PRIMARY) HYPERTENSION: ICD-10-CM

## 2021-11-17 DIAGNOSIS — B19.20 UNSPECIFIED VIRAL HEPATITIS C W/OUT HEPATIC COMA: ICD-10-CM

## 2021-11-17 PROCEDURE — 93000 ELECTROCARDIOGRAM COMPLETE: CPT

## 2021-11-17 PROCEDURE — 99214 OFFICE O/P EST MOD 30 MIN: CPT

## 2021-11-17 RX ORDER — AMLODIPINE BESYLATE 5 MG/1
5 TABLET ORAL DAILY
Qty: 90 | Refills: 3 | Status: ACTIVE | COMMUNITY
Start: 2021-11-17 | End: 1900-01-01
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
## 2021-11-17 NOTE — DISCUSSION/SUMMARY
[FreeTextEntry1] : 61 M \par \par Preop Cardiovascular Risk Assessment\par No signs/symptoms of ischemia or heart failure\par Able to achieve > 4 METs\par RCRI Score at least 1, possibly 2 if confirmed hx of CVA. \par \par EKG NSR LAFB\par Coronary CTA April 2021: Ca score 131, non obstructive CAD\par ECHO April 2021: normal ef, no significant valvular disease \par CTA Chest April 2021: no PE, 6 cm lesion right lobe liver\par \par - start baby aspirin given coronary/aortic plaque on CTA.   ? hx of cva. No imaging of the head and neck in our records\par - counseled on home bp monitoring, if readings consistently > 130/80 advised to start amlodipine 5mg daily. \par - no statin due to active liver disease; elevated calcium score at 131\par - obtain recent blood work, check lipids and hba1c \par - obtain Dr. Johnson's notes\par - obtain prior neurology records, specifically imaging\par \par

## 2021-11-17 NOTE — PHYSICAL EXAM
[General Appearance - Well Developed] : well developed [Normal Appearance] : normal appearance [Well Groomed] : well groomed [General Appearance - Well Nourished] : well nourished [No Deformities] : no deformities [General Appearance - In No Acute Distress] : no acute distress [Normal Conjunctiva] : the conjunctiva exhibited no abnormalities [Eyelids - No Xanthelasma] : the eyelids demonstrated no xanthelasmas [Normal Oral Mucosa] : normal oral mucosa [No Oral Pallor] : no oral pallor [No Oral Cyanosis] : no oral cyanosis [Normal Jugular Venous A Waves Present] : normal jugular venous A waves present [Normal Jugular Venous V Waves Present] : normal jugular venous V waves present [No Jugular Venous [place] A Waves] : no jugular venous [place] A waves [Respiration, Rhythm And Depth] : normal respiratory rhythm and effort [Exaggerated Use Of Accessory Muscles For Inspiration] : no accessory muscle use [Auscultation Breath Sounds / Voice Sounds] : lungs were clear to auscultation bilaterally [Heart Rate And Rhythm] : heart rate and rhythm were normal [Heart Sounds] : normal S1 and S2 [Murmurs] : no murmurs present [Abdomen Soft] : soft [Abdomen Tenderness] : non-tender [Abdomen Mass (___ Cm)] : no abdominal mass palpated [Abnormal Walk] : normal gait [Gait - Sufficient For Exercise Testing] : the gait was sufficient for exercise testing [Nail Clubbing] : no clubbing of the fingernails [Cyanosis, Localized] : no localized cyanosis [Petechial Hemorrhages (___cm)] : no petechial hemorrhages [Skin Color & Pigmentation] : normal skin color and pigmentation [] : no rash [No Venous Stasis] : no venous stasis [Skin Lesions] : no skin lesions [No Skin Ulcers] : no skin ulcer [No Xanthoma] : no  xanthoma was observed

## 2021-11-18 LAB
CHOLEST SERPL-MCNC: 240 MG/DL
ESTIMATED AVERAGE GLUCOSE: 128 MG/DL
HBA1C MFR BLD HPLC: 6.1 %
HDLC SERPL-MCNC: 40 MG/DL
LDLC SERPL CALC-MCNC: 180 MG/DL
NONHDLC SERPL-MCNC: 200 MG/DL
TRIGL SERPL-MCNC: 102 MG/DL